# Patient Record
Sex: MALE | Race: WHITE | Employment: OTHER | URBAN - METROPOLITAN AREA
[De-identification: names, ages, dates, MRNs, and addresses within clinical notes are randomized per-mention and may not be internally consistent; named-entity substitution may affect disease eponyms.]

---

## 2020-10-14 ENCOUNTER — HOSPITAL ENCOUNTER (OUTPATIENT)
Dept: INFUSION THERAPY | Age: 75
Discharge: HOME | End: 2020-10-14
Payer: MEDICARE

## 2020-10-14 VITALS
RESPIRATION RATE: 18 BRPM | SYSTOLIC BLOOD PRESSURE: 110 MMHG | TEMPERATURE: 97.3 F | DIASTOLIC BLOOD PRESSURE: 58 MMHG | HEART RATE: 70 BPM

## 2020-10-14 PROCEDURE — 74011250636 HC RX REV CODE- 250/636

## 2020-10-14 PROCEDURE — 96365 THER/PROPH/DIAG IV INF INIT: CPT

## 2020-10-14 PROCEDURE — 74011250636 HC RX REV CODE- 250/636: Performed by: NURSE PRACTITIONER

## 2020-10-14 PROCEDURE — 74011000258 HC RX REV CODE- 258: Performed by: NURSE PRACTITIONER

## 2020-10-14 RX ORDER — LANOLIN ALCOHOL/MO/W.PET/CERES
1000 CREAM (GRAM) TOPICAL DAILY
COMMUNITY

## 2020-10-14 RX ORDER — HEPARIN 100 UNIT/ML
500 SYRINGE INTRAVENOUS AS NEEDED
Status: DISCONTINUED | OUTPATIENT
Start: 2020-10-14 | End: 2020-10-15 | Stop reason: HOSPADM

## 2020-10-14 RX ORDER — FERROUS SULFATE, DRIED 160(50) MG
1 TABLET, EXTENDED RELEASE ORAL 2 TIMES DAILY WITH MEALS
COMMUNITY

## 2020-10-14 RX ORDER — CHOLECALCIFEROL (VITAMIN D3) 125 MCG
CAPSULE ORAL 2 TIMES DAILY
COMMUNITY

## 2020-10-14 RX ORDER — LEVOTHYROXINE SODIUM 137 UG/1
TABLET ORAL DAILY
COMMUNITY

## 2020-10-14 RX ORDER — SODIUM CHLORIDE 0.9 % (FLUSH) 0.9 %
5-10 SYRINGE (ML) INJECTION AS NEEDED
Status: DISCONTINUED | OUTPATIENT
Start: 2020-10-14 | End: 2020-10-15 | Stop reason: HOSPADM

## 2020-10-14 RX ADMIN — HEPARIN 500 UNITS: 100 SYRINGE at 16:47

## 2020-10-14 RX ADMIN — Medication 10 ML: at 16:45

## 2020-10-14 RX ADMIN — CEFTRIAXONE SODIUM 2 G: 2 INJECTION, POWDER, FOR SOLUTION INTRAMUSCULAR; INTRAVENOUS at 16:44

## 2020-10-14 RX ADMIN — Medication 10 ML: at 16:47

## 2020-10-14 RX ADMIN — HEPARIN 500 UNITS: 100 SYRINGE at 16:45

## 2020-10-14 NOTE — PROGRESS NOTES
[facility] Note:  [id number] arrived at [facility] ambulatory and in no distress for daily antibiotic. Assessment stable, no  complaints voiced. Wife accompanied pt to answer questions. Has pace maker/defibrillaor. Discharged from [place] yesterday. Live in [facility]. Medications received:  Medications Administered     cefTRIAXone (ROCEPHIN) 2 g in 0.9% sodium chloride (MBP/ADV) 50 mL     Admin Date  10/14/2020 Action  New Bag Dose  2 g Rate  100 mL/hr Route  IntraVENous Administered By  Kendall Mccauley, TISHA          heparin (porcine) pf 500 Units     Admin Date  10/14/2020 Action  Given Dose  500 Units Route  IntraVENous Administered By  Kendall Mccauley, RN           Admin Date  10/14/2020 Action  Given Dose  500 Units Route  IntraVENous Administered By  Kendall Mccauley, TISHA          sodium chloride (NS) flush 5-10 mL     Admin Date  10/14/2020 Action  Given Dose  10 mL Route  IntraVENous Administered By  Kendall Mccauley, RN           Admin Date  10/14/2020 Action  Given Dose  10 mL Route  IntraVENous Administered By  Kendall Mccauley, RN                  4084 Tolerated treatment well, no adverse reaction noted. D/Cd from [facility] ambulatory and in no distress accompanied by swif. Next [id number] 10/15  Visit Vitals  BP (!) 110/58   Pulse 70   Temp 97.3 °F (36.3 °C)   Resp 18     No results found for this or any previous visit (from the past 12 hour(s)).

## 2020-10-15 ENCOUNTER — HOSPITAL ENCOUNTER (OUTPATIENT)
Dept: INFUSION THERAPY | Age: 75
Discharge: HOME | End: 2020-10-15
Payer: MEDICARE

## 2020-10-15 VITALS
SYSTOLIC BLOOD PRESSURE: 111 MMHG | HEART RATE: 65 BPM | TEMPERATURE: 97.3 F | DIASTOLIC BLOOD PRESSURE: 56 MMHG | RESPIRATION RATE: 16 BRPM

## 2020-10-15 PROCEDURE — 96365 THER/PROPH/DIAG IV INF INIT: CPT

## 2020-10-15 PROCEDURE — 74011250636 HC RX REV CODE- 250/636: Performed by: NURSE PRACTITIONER

## 2020-10-15 PROCEDURE — 74011000258 HC RX REV CODE- 258: Performed by: NURSE PRACTITIONER

## 2020-10-15 RX ORDER — SODIUM CHLORIDE 0.9 % (FLUSH) 0.9 %
5-10 SYRINGE (ML) INJECTION AS NEEDED
Status: DISCONTINUED | OUTPATIENT
Start: 2020-10-15 | End: 2020-10-16 | Stop reason: HOSPADM

## 2020-10-15 RX ORDER — HEPARIN 100 UNIT/ML
500 SYRINGE INTRAVENOUS AS NEEDED
Status: DISCONTINUED | OUTPATIENT
Start: 2020-10-15 | End: 2020-10-16 | Stop reason: HOSPADM

## 2020-10-15 RX ADMIN — Medication 10 ML: at 17:05

## 2020-10-15 RX ADMIN — CEFTRIAXONE SODIUM 2 G: 2 INJECTION, POWDER, FOR SOLUTION INTRAMUSCULAR; INTRAVENOUS at 17:10

## 2020-10-15 RX ADMIN — HEPARIN 500 UNITS: 100 SYRINGE at 17:47

## 2020-10-15 NOTE — PROGRESS NOTES
Outpatient Infusion Center Short Visit Progress Note    1700 Pt admit to [facility] for daily Rocephin ambulatory in stable condition. Assessment completed. No new concerns voiced. Double lumen PICC flushed w/ positive blood return in both lumens. Patient Vitals for the past 12 hrs:   Temp Pulse Resp BP   10/15/20 1701 97.3 °F (36.3 °C) 65 16 (!) 111/56       Medications Administered     cefTRIAXone (ROCEPHIN) 2 g in 0.9% sodium chloride (MBP/ADV) 50 mL     Admin Date  10/15/2020 Action  New Bag Dose  2 g Rate  100 mL/hr Route  IntraVENous Administered By  Flaquita Duvall RN          heparin (porcine) pf 500 Units     Admin Date  10/15/2020 Action  Given Dose  500 Units Route  IntraVENous Administered By  Flaquita Duvall RN          sodium chloride (NS) flush 5-10 mL     Admin Date  10/15/2020 Action  Given Dose  10 mL Route  IntraVENous Administered By  Flaquita Duvall RN                  1800 Pt tolerated treatment well. PICC line flushed, heparinized and new end caps placed. D/c home ambulatory in no distress. Pt aware of next appointment scheduled for 10/16/2020.

## 2020-10-16 ENCOUNTER — HOSPITAL ENCOUNTER (OUTPATIENT)
Dept: INFUSION THERAPY | Age: 75
Discharge: HOME | End: 2020-10-16
Payer: MEDICARE

## 2020-10-16 VITALS
DIASTOLIC BLOOD PRESSURE: 55 MMHG | HEART RATE: 54 BPM | RESPIRATION RATE: 16 BRPM | SYSTOLIC BLOOD PRESSURE: 103 MMHG | TEMPERATURE: 97.3 F

## 2020-10-16 PROCEDURE — 96365 THER/PROPH/DIAG IV INF INIT: CPT

## 2020-10-16 PROCEDURE — 74011000258 HC RX REV CODE- 258

## 2020-10-16 PROCEDURE — 74011250636 HC RX REV CODE- 250/636

## 2020-10-16 PROCEDURE — 74011000258 HC RX REV CODE- 258: Performed by: NURSE PRACTITIONER

## 2020-10-16 PROCEDURE — 74011250636 HC RX REV CODE- 250/636: Performed by: NURSE PRACTITIONER

## 2020-10-16 RX ORDER — HEPARIN 100 UNIT/ML
500 SYRINGE INTRAVENOUS AS NEEDED
Status: DISCONTINUED | OUTPATIENT
Start: 2020-10-16 | End: 2020-10-17 | Stop reason: HOSPADM

## 2020-10-16 RX ORDER — SODIUM CHLORIDE 0.9 % (FLUSH) 0.9 %
5-10 SYRINGE (ML) INJECTION AS NEEDED
Status: DISCONTINUED | OUTPATIENT
Start: 2020-10-16 | End: 2020-10-17 | Stop reason: HOSPADM

## 2020-10-16 RX ORDER — SODIUM CHLORIDE 9 MG/ML
25 INJECTION, SOLUTION INTRAVENOUS CONTINUOUS
Status: DISCONTINUED | OUTPATIENT
Start: 2020-10-16 | End: 2020-10-17 | Stop reason: HOSPADM

## 2020-10-16 RX ADMIN — Medication 10 ML: at 16:44

## 2020-10-16 RX ADMIN — CEFTRIAXONE SODIUM 2 G: 2 INJECTION, POWDER, FOR SOLUTION INTRAMUSCULAR; INTRAVENOUS at 16:43

## 2020-10-16 RX ADMIN — SODIUM CHLORIDE 25 ML/HR: 900 INJECTION, SOLUTION INTRAVENOUS at 16:40

## 2020-10-16 RX ADMIN — Medication 500 UNITS: at 16:44

## 2020-10-16 NOTE — PROGRESS NOTES
[facility] Short Note                       Date: 2020    Name: Choco Paredes    MRN: [id number]         : 1945      1635 Pt admit to [facility] for daily Rocephin ambulatory in stable condition. Assessment completed. No new concerns voiced. Double lumen PICC flushes with positive blood return. Mr. Karen Young vitals were reviewed prior to and after treatment. Patient Vitals for the past 12 hrs:   Temp Pulse Resp BP   10/16/20 1637 97.3 °F (36.3 °C) (!) 54 16 (!) 103/55     Medications given:   Medications Administered     0.9% sodium chloride infusion     Admin Date  10/16/2020 Action  New Bag Dose  25 mL/hr Rate  25 mL/hr Route  IntraVENous Administered By  Chava Marroquin RN          cefTRIAXone (ROCEPHIN) 2 g in 0.9% sodium chloride (MBP/ADV) 50 mL     Admin Date  10/16/2020 Action  New Bag Dose  2 g Rate  100 mL/hr Route  IntraVENous Administered By  Chava Marroquin RN          heparin (porcine) pf 500 Units     Admin Date  10/16/2020 Action  Given Dose  500 Units Route  IntraVENous Administered By  Chava Marroquin RN          sodium chloride (NS) flush 5-10 mL     Admin Date  10/16/2020 Action  Given Dose  10 mL Route  IntraVENous Administered By  Chava Marroquin RN              PICC maintained positive blood return. Lumens flushed, heparinized and capped per protocol. Mr. Colt Ramirez tolerated the infusion, had no complaints, and was discharged from [place] [address] in stable condition at 1735.      Future Appointments   Date Time Provider Farhan Aaron   10/17/2020  9:00 AM G1 [facility] [address]   10/18/2020  9:00 AM G1 [facility] [address]   10/19/2020  5:00 PM G1 [facility] [address]   10/20/2020  5:00 PM G1 [facility]OME[facility] [address]   10/21/2020  5:00 PM G1 [facility] [address]   10/22/2020  5:00 PM G1 GOMEZ [facility]   10/23/2020  5:00 PM Antonio Augustin Valverde RN  October 16, 2020  7:02 PM

## 2020-10-17 ENCOUNTER — HOSPITAL ENCOUNTER (OUTPATIENT)
Dept: INFUSION THERAPY | Age: 75
Discharge: HOME | End: 2020-10-17
Payer: MEDICARE

## 2020-10-17 VITALS
SYSTOLIC BLOOD PRESSURE: 106 MMHG | RESPIRATION RATE: 18 BRPM | DIASTOLIC BLOOD PRESSURE: 61 MMHG | TEMPERATURE: 97.8 F | HEART RATE: 55 BPM

## 2020-10-17 PROCEDURE — 74011000258 HC RX REV CODE- 258: Performed by: NURSE PRACTITIONER

## 2020-10-17 PROCEDURE — 96365 THER/PROPH/DIAG IV INF INIT: CPT

## 2020-10-17 PROCEDURE — 74011250636 HC RX REV CODE- 250/636: Performed by: NURSE PRACTITIONER

## 2020-10-17 RX ADMIN — CEFTRIAXONE SODIUM 2 G: 2 INJECTION, POWDER, FOR SOLUTION INTRAMUSCULAR; INTRAVENOUS at 09:10

## 2020-10-17 NOTE — PROGRESS NOTES
[facility] Progress Note    Date: 2020    Name: Estelle Ceballos    MRN: [id number]         : 1945    Mr. Alivia Staples Arrived ambulatory and in no distress for Antibiotics Regimen. Assessment was completed, no acute issues at this time, no new complaints voiced. PICC line flushed, +blood return in purple and white lumens,.    Mr. Madhav Bocanegra vitals were reviewed. Patient Vitals for the past 12 hrs:   Temp Pulse Resp BP   10/17/20 0907 97.8 °F (36.6 °C) (!) 55 18 106/61       Medications:  Rocephin IV    Mr. Alivia Staples tolerated treatment well and was discharged from [place] [address] in stable condition. PICC flushed & heparinized per protocol. He is to return on 10/18/20 for his next appointment.     Karrie Joseph RN  2020

## 2020-10-18 ENCOUNTER — HOSPITAL ENCOUNTER (OUTPATIENT)
Dept: INFUSION THERAPY | Age: 75
Discharge: HOME | End: 2020-10-18
Payer: MEDICARE

## 2020-10-18 VITALS
SYSTOLIC BLOOD PRESSURE: 125 MMHG | OXYGEN SATURATION: 99 % | TEMPERATURE: 98.4 F | HEART RATE: 68 BPM | RESPIRATION RATE: 18 BRPM | DIASTOLIC BLOOD PRESSURE: 58 MMHG

## 2020-10-18 PROCEDURE — 74011000258 HC RX REV CODE- 258: Performed by: NURSE PRACTITIONER

## 2020-10-18 PROCEDURE — 96365 THER/PROPH/DIAG IV INF INIT: CPT

## 2020-10-18 PROCEDURE — 74011250636 HC RX REV CODE- 250/636: Performed by: NURSE PRACTITIONER

## 2020-10-18 RX ADMIN — CEFTRIAXONE SODIUM 2 G: 2 INJECTION, POWDER, FOR SOLUTION INTRAMUSCULAR; INTRAVENOUS at 09:20

## 2020-10-18 NOTE — PROGRESS NOTES
[facility] VISIT NOTE    [id number]. Pt arrived at [facility] ambulatory and in no distress for Daily Ceftriaxone. Assessment completed, pt voiced no complaints or concerns. Right double lumen PICC flushed with positive blood return noted in both lumens and flushes easily. Dressing clean, dry and intact. Medications received:  Ceftriaxone IV    Tolerated treatment well, no adverse reaction noted. PICC flushed per protocol. Positive blood return noted. Curos caps placed on end. Patient Vitals for the past 12 hrs:   Temp Pulse Resp BP SpO2   10/18/20 0914 98.4 °F (36.9 °C) 68 18 (!) 125/58 99 %     0940. D/C'd from [facility] ambulatory and in no distress.  Next appointment is 10/19/20 at 5:00 pm.

## 2020-10-19 ENCOUNTER — HOSPITAL ENCOUNTER (OUTPATIENT)
Dept: INFUSION THERAPY | Age: 75
Discharge: HOME | End: 2020-10-19
Payer: MEDICARE

## 2020-10-19 VITALS
TEMPERATURE: 97.5 F | DIASTOLIC BLOOD PRESSURE: 60 MMHG | SYSTOLIC BLOOD PRESSURE: 103 MMHG | RESPIRATION RATE: 14 BRPM | HEART RATE: 61 BPM

## 2020-10-19 PROCEDURE — 74011000258 HC RX REV CODE- 258: Performed by: NURSE PRACTITIONER

## 2020-10-19 PROCEDURE — 96365 THER/PROPH/DIAG IV INF INIT: CPT

## 2020-10-19 PROCEDURE — 96523 IRRIG DRUG DELIVERY DEVICE: CPT

## 2020-10-19 PROCEDURE — 74011250636 HC RX REV CODE- 250/636: Performed by: NURSE PRACTITIONER

## 2020-10-19 RX ADMIN — CEFTRIAXONE SODIUM 2 G: 2 INJECTION, POWDER, FOR SOLUTION INTRAMUSCULAR; INTRAVENOUS at 16:59

## 2020-10-19 NOTE — PROGRESS NOTES
[facility] Short Note                   1889 Pt admit to [facility] for Rocephin ambulatory in stable condition. Assessment completed. No new concerns voiced. PICC line dressing was last changed on 10/12/2020. PICC line dressing changed per policy; end caps changed. PICC flushed with good blood return. Visit Vitals  /60 (BP 1 Location: Left arm, BP Patient Position: Sitting)   Pulse 61   Temp 97.5 °F (36.4 °C)   Resp 14     Medications Administered     cefTRIAXone (ROCEPHIN) 2 g in 0.9% sodium chloride (MBP/ADV) 50 mL     Admin Date  10/19/2020 Action  New Bag Dose  2 g Rate  100 mL/hr Route  IntraVENous Administered By  Zeina Garza RN              Mr. Maria R Marques tolerated the infusion, and had no complaints. PICC line flushed, heparinized, capped. Mr. Maria R Marques was discharged from [place] [address] in stable condition at 1740. D/C ambulatory home in no distress.  Patient aware of appointment tomorrow 10/20/2020 at 5:00pm.     Future Appointments   Date Time Provider Farhan Aaron   10/20/2020  5:00 PM G1 G[facility] [address]   10/21/2020  5:00 PM G1 GOMEZ [address]   10/22/2020  5:00 PM G1 GOMEZ [facility]   10/23/2020  5:00 PM G1 GOMEZ [address] Daniel RN  October 19, 2020

## 2020-10-20 ENCOUNTER — HOSPITAL ENCOUNTER (OUTPATIENT)
Dept: INFUSION THERAPY | Age: 75
Discharge: HOME | End: 2020-10-20
Payer: MEDICARE

## 2020-10-20 VITALS
DIASTOLIC BLOOD PRESSURE: 54 MMHG | TEMPERATURE: 97.3 F | SYSTOLIC BLOOD PRESSURE: 97 MMHG | RESPIRATION RATE: 16 BRPM | HEART RATE: 60 BPM

## 2020-10-20 PROCEDURE — 74011000258 HC RX REV CODE- 258: Performed by: NURSE PRACTITIONER

## 2020-10-20 PROCEDURE — 74011250636 HC RX REV CODE- 250/636: Performed by: NURSE PRACTITIONER

## 2020-10-20 PROCEDURE — 96365 THER/PROPH/DIAG IV INF INIT: CPT

## 2020-10-20 RX ADMIN — CEFTRIAXONE SODIUM 2 G: 2 INJECTION, POWDER, FOR SOLUTION INTRAMUSCULAR; INTRAVENOUS at 16:51

## 2020-10-20 NOTE — PROGRESS NOTES
[facility] Short Note                   0033 Pt admit to [facility] for Rocephin ambulatory in stable condition. Assessment completed. No new concerns voiced. PICC flushed with good blood return. Visit Vitals  BP (!) 97/54 (BP 1 Location: Left arm, BP Patient Position: Sitting)   Pulse 60   Temp 97.3 °F (36.3 °C)   Resp 16     Medications Administered     cefTRIAXone (ROCEPHIN) 2 g in 0.9% sodium chloride (MBP/ADV) 50 mL     Admin Date  10/20/2020 Action  New Bag Dose  2 g Rate  100 mL/hr Route  IntraVENous Administered By  Yohan Melendez RN              Mr. Cachorro Edward tolerated the infusion, and had no complaints. PICC line flushed, heparinized, capped. Mr. Cachorro Edward was discharged from [place] [address] in stable condition at 1720. D/C ambulatory home in no distress.  Patient aware of appointment tomorrow 10/21/2020 at 5:00pm.     Future Appointments   Date Time Provider Farhan Aaron   10/21/2020  5:00 PM G1 [facility] [address]   10/22/2020  5:00 PM G1 G[facility] [address]   10/23/2020  5:00 PM [address] [facility] JENNIFER Daniel RN  October 20, 2020

## 2020-10-21 ENCOUNTER — HOSPITAL ENCOUNTER (OUTPATIENT)
Dept: INFUSION THERAPY | Age: 75
Discharge: HOME | End: 2020-10-21
Payer: MEDICARE

## 2020-10-21 VITALS
RESPIRATION RATE: 16 BRPM | HEART RATE: 57 BPM | TEMPERATURE: 97.5 F | DIASTOLIC BLOOD PRESSURE: 59 MMHG | SYSTOLIC BLOOD PRESSURE: 105 MMHG

## 2020-10-21 PROCEDURE — 96365 THER/PROPH/DIAG IV INF INIT: CPT

## 2020-10-21 PROCEDURE — 74011000258 HC RX REV CODE- 258: Performed by: NURSE PRACTITIONER

## 2020-10-21 PROCEDURE — 74011250636 HC RX REV CODE- 250/636: Performed by: NURSE PRACTITIONER

## 2020-10-21 RX ADMIN — CEFTRIAXONE SODIUM 2 G: 2 INJECTION, POWDER, FOR SOLUTION INTRAMUSCULAR; INTRAVENOUS at 17:23

## 2020-10-22 ENCOUNTER — HOSPITAL ENCOUNTER (OUTPATIENT)
Dept: INFUSION THERAPY | Age: 75
Discharge: HOME | End: 2020-10-22
Payer: MEDICARE

## 2020-10-22 VITALS
SYSTOLIC BLOOD PRESSURE: 124 MMHG | TEMPERATURE: 97.5 F | RESPIRATION RATE: 16 BRPM | DIASTOLIC BLOOD PRESSURE: 61 MMHG | HEART RATE: 62 BPM

## 2020-10-22 PROCEDURE — 74011000258 HC RX REV CODE- 258: Performed by: NURSE PRACTITIONER

## 2020-10-22 PROCEDURE — 74011250636 HC RX REV CODE- 250/636: Performed by: NURSE PRACTITIONER

## 2020-10-22 PROCEDURE — 96365 THER/PROPH/DIAG IV INF INIT: CPT

## 2020-10-22 RX ADMIN — CEFTRIAXONE SODIUM 2 G: 2 INJECTION, POWDER, FOR SOLUTION INTRAMUSCULAR; INTRAVENOUS at 15:58

## 2020-10-22 NOTE — PROGRESS NOTES
Outpatient Infusion Center Progress Note    1600 Pt admit to [facility] for daily antibiotics ambulatory in stable condition. Assessment completed. No new concerns voiced. This is patient's last dose, and his PICC line will be removed at the conclusion of today's treatment. Visit Vitals  /64 (BP 1 Location: Right arm, BP Patient Position: Sitting)   Pulse 65   Temp 97.2 °F (36.2 °C)   Resp 16       Medications Administered     cefTRIAXone (ROCEPHIN) 2 g in 0.9% sodium chloride (MBP/ADV) 50 mL     Admin Date  10/22/2020 Action  New Bag Dose  2 g Rate  100 mL/hr Route  IntraVENous Administered By  Devin Wu                2127 Pt tolerated treatment well. PICC line pulled without difficulty, and he was supine for 20 minutes post removal. Site covered with 4x4s and large occlusive dressing. Patient is aware that he needs to keep dressing in place x 5 days. No bleeding noted upon discharge.     Sabra Barreto RN

## 2020-10-23 ENCOUNTER — HOSPITAL ENCOUNTER (OUTPATIENT)
Dept: INFUSION THERAPY | Age: 75
End: 2020-10-23

## 2023-05-11 RX ORDER — B-COMPLEX WITH VITAMIN C
1 TABLET ORAL 2 TIMES DAILY WITH MEALS
COMMUNITY

## 2023-05-11 RX ORDER — LEVOTHYROXINE SODIUM 137 UG/1
TABLET ORAL DAILY
COMMUNITY

## 2023-05-11 RX ORDER — ACYCLOVIR 400 MG/1
TABLET ORAL 2 TIMES DAILY
COMMUNITY

## 2023-05-11 RX ORDER — AZITHROMYCIN 250 MG/1
TABLET, FILM COATED ORAL
COMMUNITY
Start: 2014-04-07